# Patient Record
Sex: MALE | Race: WHITE | NOT HISPANIC OR LATINO | Employment: FULL TIME | URBAN - METROPOLITAN AREA
[De-identification: names, ages, dates, MRNs, and addresses within clinical notes are randomized per-mention and may not be internally consistent; named-entity substitution may affect disease eponyms.]

---
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
## 2021-01-19 ENCOUNTER — HOSPITAL ENCOUNTER (EMERGENCY)
Facility: MEDICAL CENTER | Age: 21
End: 2021-01-19
Attending: EMERGENCY MEDICINE
Payer: MEDICAID

## 2021-01-19 VITALS
SYSTOLIC BLOOD PRESSURE: 117 MMHG | WEIGHT: 218.7 LBS | RESPIRATION RATE: 16 BRPM | HEART RATE: 80 BPM | BODY MASS INDEX: 30.62 KG/M2 | HEIGHT: 71 IN | OXYGEN SATURATION: 98 % | DIASTOLIC BLOOD PRESSURE: 60 MMHG | TEMPERATURE: 97.7 F

## 2021-01-19 DIAGNOSIS — H20.9 UVEITIS: ICD-10-CM

## 2021-01-19 PROCEDURE — 700101 HCHG RX REV CODE 250: Performed by: EMERGENCY MEDICINE

## 2021-01-19 PROCEDURE — 99284 EMERGENCY DEPT VISIT MOD MDM: CPT

## 2021-01-19 RX ORDER — PROPARACAINE HYDROCHLORIDE 5 MG/ML
1 SOLUTION/ DROPS OPHTHALMIC ONCE
Status: COMPLETED | OUTPATIENT
Start: 2021-01-19 | End: 2021-01-19

## 2021-01-19 RX ORDER — HYDROCODONE BITARTRATE AND ACETAMINOPHEN 5; 325 MG/1; MG/1
1 TABLET ORAL EVERY 6 HOURS PRN
Qty: 12 TAB | Refills: 0 | Status: SHIPPED | OUTPATIENT
Start: 2021-01-19 | End: 2021-01-23

## 2021-01-19 RX ADMIN — PROPARACAINE HYDROCHLORIDE 1 DROP: 5 SOLUTION/ DROPS OPHTHALMIC at 17:00

## 2021-01-19 RX ADMIN — FLUORESCEIN SODIUM 1 MG: 1 STRIP OPHTHALMIC at 17:00

## 2021-01-19 ASSESSMENT — PAIN DESCRIPTION - PAIN TYPE: TYPE: ACUTE PAIN

## 2021-01-19 ASSESSMENT — LIFESTYLE VARIABLES: DO YOU DRINK ALCOHOL: NO

## 2021-01-19 NOTE — ED TRIAGE NOTES
"Chief Complaint   Patient presents with   • Eye Pain     Pt was welding a couple weeks ago and was \"flashed\" with no mask on. The next morning pts eyes were red and painful. Pt states there is drainage and more when he wakes up. Pt was using eye drops with no relief. Pt states his vision is blurry.      /65   Pulse 87   Temp 36.5 °C (97.7 °F) (Temporal)   Resp 16   Ht 1.803 m (5' 11\")   Wt 99.2 kg (218 lb 11.1 oz)   SpO2 98%   BMI 30.50 kg/m²     Patient arrived to ED for the above complaint. Patient placed in lobby. Told to notify staff of any changes.   "

## 2021-01-20 NOTE — DISCHARGE INSTRUCTIONS
Please use artifical tears as needed.   Take all medications as prescribed.   Call the office of Dr. Melgar in the morning to make an appointment.

## 2021-01-20 NOTE — ED NOTES
Santy Villa discharged via ambulation with self.  Discharge instructions given and reviewed, patient educated to follow up with opthalmology, verbalized understanding.  Prescriptions given x 1 for electronic pickup, verbalized understanding.  All personal belongings in possession.  No questions at this time.

## 2021-01-20 NOTE — DISCHARGE PLANNING
note:  Pt called in because he was unable to  prescription for NORCO..      CM called Mitali at [facility] who said that they have received the precription but the insurance only pays for 3 days. Per Mitali, if pt wants the 4 days worth then he has to pay about $11.00. Pt is agreeable to pay. He is at [facility] at this time to  prescription.

## 2021-01-20 NOTE — ED PROVIDER NOTES
"ED Provider Note    CHIEF COMPLAINT  Chief Complaint   Patient presents with   • Eye Pain     Pt was welding a couple weeks ago and was \"flashed\" with no mask on. The next morning pts eyes were red and painful. Pt states there is drainage and more when he wakes up. Pt was using eye drops with no relief. Pt states his vision is blurry.        HPI  Santy Villa is a 20 y.o. male here for evaluation of bilateral eye pain. The pt was welding a couple weeks ago, and noted it 'flashed' but he did not have anything get into his eyes.  He has no issues, other than the watering of the eyes and some photophobia.   No vomiting, no headache, no trauma.     ROS;  Please see HPI  O/W negative     PAST MEDICAL HISTORY   no bleeding disorders     SOCIAL HISTORY  Social History     Tobacco Use   • Smoking status: Current Every Day Smoker     Types: Cigarettes   • Smokeless tobacco: Never Used   Substance and Sexual Activity   • Alcohol use: No   • Drug use: Not Currently     Comment: in rehab for [facility]   • Sexual activity: Not on file       SURGICAL HISTORY  patient denies any surgical history    CURRENT MEDICATIONS  Home Medications     Reviewed by Michelle Castillo R.N. (Registered Nurse) on 01/19/21 at 1556  Med List Status: Not Addressed   Medication Last Dose Status   citalopram (CELEXA) 20 MG Tab  Active   ibuprofen (MOTRIN) 800 MG Tab  Active   MELATONIN PO  Active   Non Formulary Request  Active                ALLERGIES  No Known Allergies    REVIEW OF SYSTEMS  See HPI for further details. Review of systems as above, otherwise all other systems are negative.     PHYSICAL EXAM  VITAL SIGNS: /65   Pulse 87   Temp 36.5 °C (97.7 °F) (Temporal)   Resp 16   Ht 1.803 m (5' 11\")   Wt 99.2 kg (218 lb 11.1 oz)   SpO2 98%   BMI 30.50 kg/m²     Constitutional: Well developed, well nourished. No acute distress.  HEENT: Normocephalic, atraumatic. MMM.    Eyes;  Bilateral scleral injection.  Eomi, perrl.    Neck: " Supple, Full range of motion   Chest/Pulmonary:  No respiratory distress.  Equal expansion   Musculoskeletal: No deformity, no edema, neurovascular intact.   Neuro: Clear speech, appropriate, cooperative, cranial nerves II-XII grossly intact.  Psych: Normal mood and affect      PROCEDURES   Slit lamp exam;  proparacaine drops placed to bilateral eyes  Fluorescein strip placed  No dye uptake  Pt tolerated well.       MEDICAL RECORD  I have reviewed patient's medical record and pertinent results are listed.    COURSE & MEDICAL DECISION MAKING  I have reviewed any medical record information, laboratory studies and radiographic results as noted above.    5:25 PM  I spoke to sandra Young for ophthalmology.  She states to have the pt call her office tomorrow morning. She will see.  Artifical tears are ok, nothing else at this time.     I you have had any blood pressure issues while here in the emergency department, please see your doctor for a further evaluation or work up.    Differential diagnoses include but not limited to: keratitis, burn, fb, conjunctivitis     This patient presents with scleral injection .  At this time, I have counseled the patient/family regarding their medications, pain control, and follow up.  They will continue their medications, if any, as prescribed.  They will return immediately for any worsening symptoms and/or any other medical concerns.  They will see their doctor, or contact the doctor provided, in 1-2 days for follow up.       FINAL IMPRESSION  Scleral injection       Electronically signed by: Pedro Luis Mederos D.O., 1/19/2021 5:07 PM